# Patient Record
Sex: FEMALE | Race: BLACK OR AFRICAN AMERICAN | NOT HISPANIC OR LATINO | URBAN - METROPOLITAN AREA
[De-identification: names, ages, dates, MRNs, and addresses within clinical notes are randomized per-mention and may not be internally consistent; named-entity substitution may affect disease eponyms.]

---

## 2018-06-22 PROBLEM — Z00.00 ENCOUNTER FOR PREVENTIVE HEALTH EXAMINATION: Status: ACTIVE | Noted: 2018-06-22

## 2022-09-20 ENCOUNTER — EMERGENCY (EMERGENCY)
Facility: HOSPITAL | Age: 27
LOS: 1 days | Discharge: HOME | End: 2022-09-20
Attending: EMERGENCY MEDICINE | Admitting: EMERGENCY MEDICINE

## 2022-09-20 VITALS
SYSTOLIC BLOOD PRESSURE: 134 MMHG | OXYGEN SATURATION: 100 % | DIASTOLIC BLOOD PRESSURE: 97 MMHG | TEMPERATURE: 98 F | HEART RATE: 17 BPM

## 2022-09-20 VITALS
HEART RATE: 61 BPM | DIASTOLIC BLOOD PRESSURE: 82 MMHG | SYSTOLIC BLOOD PRESSURE: 126 MMHG | RESPIRATION RATE: 16 BRPM | OXYGEN SATURATION: 100 %

## 2022-09-20 PROCEDURE — 99283 EMERGENCY DEPT VISIT LOW MDM: CPT

## 2022-09-20 RX ORDER — ONDANSETRON 8 MG/1
4 TABLET, FILM COATED ORAL ONCE
Refills: 0 | Status: COMPLETED | OUTPATIENT
Start: 2022-09-20 | End: 2022-09-20

## 2022-09-20 RX ORDER — ONDANSETRON 8 MG/1
1 TABLET, FILM COATED ORAL
Qty: 10 | Refills: 0
Start: 2022-09-20

## 2022-09-20 RX ORDER — OXYCODONE AND ACETAMINOPHEN 5; 325 MG/1; MG/1
1 TABLET ORAL ONCE
Refills: 0 | Status: DISCONTINUED | OUTPATIENT
Start: 2022-09-20 | End: 2022-09-20

## 2022-09-20 RX ORDER — IBUPROFEN 200 MG
600 TABLET ORAL ONCE
Refills: 0 | Status: COMPLETED | OUTPATIENT
Start: 2022-09-20 | End: 2022-09-20

## 2022-09-20 RX ADMIN — Medication 600 MILLIGRAM(S): at 12:19

## 2022-09-20 RX ADMIN — ONDANSETRON 4 MILLIGRAM(S): 8 TABLET, FILM COATED ORAL at 12:19

## 2022-09-20 RX ADMIN — Medication 1 TABLET(S): at 11:12

## 2022-09-20 RX ADMIN — OXYCODONE AND ACETAMINOPHEN 1 TABLET(S): 5; 325 TABLET ORAL at 12:19

## 2022-09-20 RX ADMIN — Medication 600 MILLIGRAM(S): at 11:12

## 2022-09-20 RX ADMIN — OXYCODONE AND ACETAMINOPHEN 1 TABLET(S): 5; 325 TABLET ORAL at 11:12

## 2022-09-20 NOTE — ED PROVIDER NOTE - PATIENT PORTAL LINK FT
You can access the FollowMyHealth Patient Portal offered by [facility] by registering at the following website: [email or web address]. By joining [employer]’s FollowMyHealth portal, you will also be able to view your health information using other applications (apps) compatible with our system.

## 2022-09-20 NOTE — ED PROVIDER NOTE - OBJECTIVE STATEMENT
28 y/o F w/ no pertinent PMHx presents to ED c/o dental pain. Pt reports she developed dental pain in 05/2022 and was informed by her dentist at the time that she would  need a root canal. She states she has been looking for an oral surgeon who will perform the procedure and that accepts her insurance. Notes the pain has been worsening. She expresses that pain was previously well-controlled by OTC medications, however, pain is now too severe. Pt states she has hot and cold intolerance. Denies fever and chills. 26 y/o F w/ no pertinent PMHx presents to ED c/o dental pain. Pt reports she developed dental pain in 05/2022 and was informed by her dentist at the time that she would  need a root canal. She states she has been looking for an oral surgeon who will perform the procedure and that accepts her insurance. Notes the pain has been worsening. She expresses that pain was previously well-controlled by OTC medications, however, pain is now too severe. Pt states she has hot and cold intolerance over that tooth. Denies fever and chills.

## 2022-09-20 NOTE — ED ADULT NURSE REASSESSMENT NOTE - SYMPTOMS
pt states jaw pain is gone but she is nauseaous after medication. no vomiting, provider aware, given zofran as ordered

## 2022-09-20 NOTE — ED ADULT NURSE NOTE - CHIEF COMPLAINT
Behavioral Health discharge instructions and resources have been added to the After Visit Summary. .Primitivo spoke with pt who requested IP treatment for cocaine use but explained to him that IP treatment was not an option but [facility] . Pt was upset and refused to continue to talk ED MD and RN aware    The patient is a 27y Female complaining of

## 2022-09-20 NOTE — ED PROVIDER NOTE - TOOTH FINDINGS
right upper 5th tooth from the middle, tender to percussion. fracture to the tooth w/ exposed dentin. no gingival erythema or swelling.

## 2022-09-20 NOTE — ED PROVIDER NOTE - CLINICAL SUMMARY MEDICAL DECISION MAKING FREE TEXT BOX
26 y/o F w/ no pertinent PMHx presents to ED c/o dental pain. Likely pupitis w/ fractured tooth. Will provide pain control and assist in securing oral surgeon follow-up for root canal procedure. Will obtain UCG, Augmentin, ibuprofen, and percocet. I discussed the pt w/ discharge center and they will discuss oral surgeon followup. 28 y/o F w/ no pertinent PMHx presents to ED c/o dental pain. Likely pulpitis w/ fractured tooth. Will provide pain control and assist in securing oral surgeon follow-up for root canal procedure. Will obtain UCG, give Augmentin, ibuprofen, and percocet. I discussed the pt w/ discharge center and they will discuss oral surgeon followup.

## 2022-09-20 NOTE — ED ADULT NURSE NOTE - OBJECTIVE STATEMENT
27 y female received in intake 15, A&OX4,  ambulatory. Pt c/o right upper mouth pain that radiates to her lower mouth and headache. Pt knew she had infected root canal as of May 2022, left unteated, pain worsening over last few days that prompted ED visit. Pt states there is bleeding in her gums that is normal due to her "poor dental hygiene" Denies dizziness, blurry vision, CP, SOB, drooling, difficulty swallowing, difficulty clearing secretions. Medicated as EMR orders. Educated not to drive while taking medications. Crackers given.

## 2022-09-20 NOTE — ED ADULT TRIAGE NOTE - CHIEF COMPLAINT QUOTE
pt informed she needed a root canal in may, states she has been unable to get an appointment with an oral surgeon. c/o pain.

## 2023-04-07 ENCOUNTER — EMERGENCY (EMERGENCY)
Facility: HOSPITAL | Age: 28
LOS: 1 days | Discharge: HOME | End: 2023-04-07
Attending: INTERNAL MEDICINE | Admitting: INTERNAL MEDICINE
Payer: MEDICAID

## 2023-04-07 VITALS
DIASTOLIC BLOOD PRESSURE: 79 MMHG | TEMPERATURE: 99 F | HEART RATE: 93 BPM | OXYGEN SATURATION: 100 % | SYSTOLIC BLOOD PRESSURE: 121 MMHG | HEIGHT: 69 IN | WEIGHT: 179.9 LBS | RESPIRATION RATE: 16 BRPM

## 2023-04-07 PROCEDURE — 90471 IMMUNIZATION ADMIN: CPT

## 2023-04-07 PROCEDURE — 99283 EMERGENCY DEPT VISIT LOW MDM: CPT | Mod: 25

## 2023-04-07 PROCEDURE — 99284 EMERGENCY DEPT VISIT MOD MDM: CPT

## 2023-04-07 PROCEDURE — 90377 RABIES IG HT&SOL HUMAN IM/SC: CPT

## 2023-04-07 PROCEDURE — 90675 RABIES VACCINE IM: CPT

## 2023-04-07 RX ORDER — RABIES VACC, HUMAN DIPLOID/PF 2.5 UNIT
1 VIAL (EA) INTRAMUSCULAR ONCE
Refills: 0 | Status: DISCONTINUED | OUTPATIENT
Start: 2023-04-07 | End: 2023-04-07

## 2023-04-07 RX ORDER — RABIES VACC, HUMAN DIPLOID/PF 2.5 UNIT
1 VIAL (EA) INTRAMUSCULAR ONCE
Refills: 0 | Status: COMPLETED | OUTPATIENT
Start: 2023-04-07 | End: 2023-04-07

## 2023-04-07 RX ORDER — HUMAN RABIES VIRUS IMMUNE GLOBULIN 150 [IU]/ML
1650 INJECTION, SOLUTION INTRAMUSCULAR ONCE
Refills: 0 | Status: COMPLETED | OUTPATIENT
Start: 2023-04-07 | End: 2023-04-07

## 2023-04-07 RX ADMIN — Medication 1 TABLET(S): at 18:01

## 2023-04-07 RX ADMIN — Medication 1 MILLILITER(S): at 17:28

## 2023-04-07 RX ADMIN — HUMAN RABIES VIRUS IMMUNE GLOBULIN 1650 UNIT(S): 150 INJECTION, SOLUTION INTRAMUSCULAR at 17:27

## 2023-04-07 NOTE — ED ADULT TRIAGE NOTE - CHIEF COMPLAINT QUOTE
Pt was bit and scratched by cat that was not up to date on his rabies shot, one year overdue.  pt seen at [facility] and sent here for rabies shot. Pt was bit and scratched by cat that was not up to date on his rabies shot, one year overdue.  pt seen at [facility] and sent here for rabies shot. Pt was bit and scratched by cat that was not up to date on his rabies shot, one year overdue.  pt seen at [facility] and sent here for rabies shot.

## 2023-04-07 NOTE — ED ADULT NURSE NOTE - OBJECTIVE STATEMENT
Pt BIB self c/o cat bite to left hand. 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 Pt states the cat was and indoor cat that was not up to date with rabies vaccines.

## 2023-04-07 NOTE — ED ADULT NURSE NOTE - NSIMPLEMENTINTERV_GEN_ALL_ED
Implemented All Universal Safety Interventions:  [place] to call system. Call bell, personal items and telephone within reach. Instruct patient to call for assistance. Room bathroom lighting operational. Non-slip footwear when patient is off stretcher. Physically safe environment: no spills, clutter or unnecessary equipment. Stretcher in lowest position, wheels locked, appropriate side rails in place. Implemented All Universal Safety Interventions:  [place] to call system. Call bell, personal items and telephone within reach. Instruct patient to call for assistance. Room bathroom lighting operational. Non-slip footwear when patient is off stretcher. Physically safe environment: no spills, clutter or unnecessary equipment. Stretcher in lowest position, wheels locked, appropriate side rails in place. Implemented All Universal Safety Interventions:  [place] to call system. Call bell, personal items and telephone within reach. Instruct patient to call for assistance. Room bathroom lighting operational. Non-slip footwear when patient is off stretcher. Physically safe environment: no spills, clutter or unnecessary equipment. Stretcher in lowest position, wheels locked, appropriate side rails in place.

## 2023-04-07 NOTE — ED PROVIDER NOTE - PHYSICAL EXAMINATION
General:     NAD, well-nourished, well-appearing  Head:     NC/AT, EOMI, oral mucosa moist  Neck:     trachea midline  Lungs:     CTA b/l, no w/r/r  CVS:     S1S2, RRR, no m/g/r  Abd:     +BS, s/nt/nd, no organomegaly  Ext:    2+ radial and pedal pulses, no c/c/e  Left hand on the medial aspect medial to the thenar eminence  2 puncture marks  Neuro: AAOx3, no sensory/motor deficits

## 2023-04-07 NOTE — ED PROVIDER NOTE - CLINICAL SUMMARY MEDICAL DECISION MAKING FREE TEXT BOX
27-year-old female #referred from the [facility] MD cat bite left hand 1 PM today Not vaccinated stray cat patient was treated with tetanus 1 mL IM at significant and was sent here for rabies vac  and antibiotics  patient denies any past medical history  Plan to give the rabies vaccine rabies immunoglobulin in the hand and Augmentin   schedule the second dose April 10 the third dose April 14 third dose April 21 and the fifth dose May 5 27-year-old female #referred from the [facility] MD cat bite left hand 1 PM today Not vaccinated stray cat patient was treated with tetanus 1 mL IM at significant and was sent here for rabies vac  and antibiotics  patient denies any past medical history  Plan to give the rabies vaccine rabies immunoglobulin in the hand and Augmentin   schedule the second dose April 10 the third dose April 14 third dose April 21 and the fifth dose May 5 27-year-old female #referred from the [facility] MD cat bite left hand 1 PM today Not vaccinated stray cat patient was treated with tetanus 1 mL IM at significant and was sent here for rabies vac  and antibiotics  patient denies any past medical history  Plan to give the rabies vaccine rabies immunoglobulin in the hand and Augmentin   schedule the second dose April 10 the third dose April 14 third dose April 21 and the fifth dose May 5

## 2023-04-07 NOTE — ED PROVIDER NOTE - NSFOLLOWUPINSTRUCTIONS_ED_ALL_ED_FT
Follow up with your PMD within 1-2 days.  Rest, increase your fluids, advance your activity as tolerated.   Take all of your other medications as previously prescribed.   Worsening, continued or ANY new concerning symptoms return to the emergency department.  Continue Augmentin 875 mg twice daily rabies schedule the second dose April 10 the third dose April 14 third dose April 21 and the fifth dose May 5

## 2023-04-07 NOTE — ED PROVIDER NOTE - PATIENT PORTAL LINK FT
You can access the FollowMyHealth Patient Portal offered by [facility] by registering at the following website: [email or web address]. By joining [employer]’s FollowMyHealth portal, you will also be able to view your health information using other applications (apps) compatible with our system. You can access the FollowMyHealth Patient Portal offered by [facility] by registering at the following website: [email or web address]. By joining [employer]’s FollowMyHealth portal, you will also be able to view your health information using other applications (apps) compatible with our system. You can access the FollowMyHealth Patient Portal offered by [facility] by registering at the following website: [email or web address]. By joining [employer]’s FollowMyHealth portal, you will also be able to view your health information using other applications (apps) compatible with our system.

## 2023-04-07 NOTE — ED ADULT NURSE NOTE - WILL THE PATIENT ACCEPT THE PFIZER COVID-19 VACCINE IF ELIGIBLE AND IT IS AVAILABLE?
Patient has a appointment with PCP on April 26. Patient is out of meds and would like a refill until appointment.    hTierno     No

## 2023-04-07 NOTE — ED PROVIDER NOTE - OBJECTIVE STATEMENT
27-year-old female #referred from the [facility] MD cat bite left hand 1 PM today Not vaccinated stray cat patient was treated with tetanus 1 mL IM at significant and was sent here for rabies vac  and antibiotics  patient denies any past medical history 27-year-old female #referred from the [facility] MD cat bite left hand 1 PM today Not vaccinated stray cat patient was treated with tetanus 1 mL IM at significant and was sent here for rabies vac  and antibiotics  patient denies any past medical history 27-year-old female #referred from the [facility] MD cat bite left hand 1 PM today Not vaccinated stray cat patient was treated with tetanus 1 mL IM at significant and was sent here for rabies vac  and antibiotics  patient denies any past medical history

## 2023-04-07 NOTE — ED ADULT NURSE NOTE - CHIEF COMPLAINT QUOTE
Pt was bit and scratched by cat that was not up to date on his rabies shot, one year overdue.  pt seen at [facility] and sent here for rabies shot. Pt was bit and scratched by cat that was not up to date on his rabies shot, one year overdue.  pt seen at [facility] and sent here for rabies shot. Pt was bit and scratched by cat that was not up to date on his rabies shot, one year overdue.  pt seen at [facility] and sent here for rabies shot.

## 2023-04-10 ENCOUNTER — EMERGENCY (EMERGENCY)
Facility: HOSPITAL | Age: 28
LOS: 1 days | Discharge: HOME | End: 2023-04-10
Attending: STUDENT IN AN ORGANIZED HEALTH CARE EDUCATION/TRAINING PROGRAM | Admitting: STUDENT IN AN ORGANIZED HEALTH CARE EDUCATION/TRAINING PROGRAM
Payer: MEDICAID

## 2023-04-10 VITALS
HEIGHT: 69 IN | OXYGEN SATURATION: 99 % | SYSTOLIC BLOOD PRESSURE: 127 MMHG | WEIGHT: 179.9 LBS | RESPIRATION RATE: 18 BRPM | TEMPERATURE: 99 F | HEART RATE: 70 BPM | DIASTOLIC BLOOD PRESSURE: 84 MMHG

## 2023-04-10 PROCEDURE — 99281 EMR DPT VST MAYX REQ PHY/QHP: CPT | Mod: 25

## 2023-04-10 PROCEDURE — 99283 EMERGENCY DEPT VISIT LOW MDM: CPT

## 2023-04-10 PROCEDURE — 90675 RABIES VACCINE IM: CPT

## 2023-04-10 PROCEDURE — 90471 IMMUNIZATION ADMIN: CPT

## 2023-04-10 RX ORDER — RABIES VACC, HUMAN DIPLOID/PF 2.5 UNIT
1 VIAL (EA) INTRAMUSCULAR ONCE
Refills: 0 | Status: COMPLETED | OUTPATIENT
Start: 2023-04-10 | End: 2023-04-10

## 2023-04-10 RX ADMIN — Medication 1 MILLILITER(S): at 19:35

## 2023-04-10 NOTE — ED PROVIDER NOTE - PATIENT PORTAL LINK FT
You can access the FollowMyHealth Patient Portal offered by [facility] by registering at the following website: [email or web address]. By joining [employer]’s FollowMyHealth portal, you will also be able to view your health information using other applications (apps) compatible with our system. You can access the FollowMyHealth Patient Portal offered by [facility] by registering at the following website: [email or web address]. By joining [employer]’s FollowMyHealth portal, you will also be able to view your health information using other applications (apps) compatible with our system. You can access the FollowMyHealth Patient Portal offered by [facility] by registering at the following website: [email or web address]. By joining [employer]’s FollowMyHealth portal, you will also be able to view your health information using other applications (apps) compatible with our system.

## 2023-04-10 NOTE — ED PROVIDER NOTE - OBJECTIVE STATEMENT
27 yr old female with no PMHX presents s/p cat bite on 4/7 and seen in ED, given rx for Augmentin, first dose of rabies vaccine, presents today for second dose of rabies vaccine. Pt with no complaints. Denies any other symptoms.

## 2023-04-10 NOTE — ED ADULT NURSE NOTE - NSIMPLEMENTINTERV_GEN_ALL_ED
Implemented All Universal Safety Interventions:  [place] to call system. Call bell, personal items and telephone within reach. Instruct patient to call for assistance. Room bathroom lighting operational. Non-slip footwear when patient is off stretcher. Physically safe environment: no spills, clutter or unnecessary equipment. Stretcher in lowest position, wheels locked, appropriate side rails in place. Implemented All Universal Safety Interventions:  [place] to call system. Call bell, personal items and telephone within reach. Instruct patient to call for assistance. Room bathroom lighting operational. Non-slip footwear when patient is off stretcher. Physically safe environment: no spills, clutter or unnecessary equipment. Stretcher in lowest position, wheels locked, appropriate side rails in place. Implemented All Universal Safety Interventions:  [place] to call system. Call bell, personal items and telephone within reach. Instruct patient to call for assistance. Room bathroom lighting operational. Non-slip footwear when patient is off stretcher. Physically safe environment: no spills, clutter or unnecessary equipment. Stretcher in lowest position, wheels locked, appropriate side rails in place.

## 2023-04-10 NOTE — ED ADULT NURSE NOTE - OBJECTIVE STATEMENT
27F presents to ED reporting need for rabies vaccination, pt in NAD denies any other complaints at this time. pt updted and educated on rabies vaccination schedule and follow up.

## 2023-04-10 NOTE — ED PROVIDER NOTE - CLINICAL SUMMARY MEDICAL DECISION MAKING FREE TEXT BOX
27 yr old female with no PMHX presents s/p cat bite on 4/7 and seen in ED, given rx for Augmentin, first dose of rabies vaccine, presents today for second dose of rabies vaccine. Pt with no complaints. Denies any other symptoms.   Follow up with your PMD within 1-2 days.

## 2023-04-10 NOTE — ED PROVIDER NOTE - NSFOLLOWUPINSTRUCTIONS_ED_ALL_ED_FT
Follow up with your PMD within 1-2 days.  Worsening, continued or ANY new concerning symptoms return to the emergency department.  Continue taking Augmentin 875 mg twice daily   rabies schedule:  third dose April 14 third dose April 21 and the fifth dose May 5      Rabies Vaccine (By injection)  Rabies Vaccine (RAY-beevilma VAX-een)    Prevents infection caused by rabies virus. The vaccine can be given before or after you are exposed to the rabies virus.    Brand Name(s):Imovax Rabies,RabAvert  There may be other brand names for this medicine.    When This Medicine Should Not Be Used:  This medicine is not right for everyone. You should not receive this vaccine if you had an allergic reaction to rabies vaccine.    How to Use This Medicine:  Injectable    Your doctor will prescribe your exact dose and tell you how often it should be given. This medicine is given as a shot into one of your muscles. The vaccine is injected into the upper arm muscle. Very young or small children may have the vaccine injected into the upper leg muscle.  A nurse or other health provider will give you this medicine.  You are at risk for exposure to the rabies virus if you work with animals or will be going to a country where rabies is common. People who are at risk of being exposed to rabies will receive 3 doses on 3 different days within a 1-month period.  If you have received the vaccine in the past and have been exposed to the rabies virus, you will need to receive 2 doses on 2 different days within a 1-month period.  If you have not yet received the vaccine and were exposed to the rabies virus, you will need a total of 5 doses on 5 different days within a 1-month period. You will also receive a shot of rabies immune globulin.  It is very important that you have the shots on the exact day your doctor tells you to.  Missed dose: You must use this medicine on a fixed schedule. Call your doctor or pharmacist if you miss a dose.  Drugs and Foods to Avoid:  Ask your doctor or pharmacist before using any other medicine, including over-the-counter medicines, vitamins, and herbal products.    Tell your doctor before you receive this vaccine if you take medicine that weakens your immune system, such as a steroid (including dexamethasone, hydrocortisone, methylprednisolone, prednisolone, prednisone) or cancer treatment.  Warnings While Using This Medicine:    Tell your doctor if you are pregnant or breastfeeding. Tell your doctor if you have had an allergic reaction to any type of vaccine, if you have an illness with fever, or if you have an immune system problem.  This medicine is made from donated human blood. Some human blood products have transmitted viruses to people who have received them, although the risk is low. Human donors and donated blood are both tested for viruses to keep the transmission risk low. Talk with your doctor about this risk if you are concerned.  Your doctor will do lab tests at regular visits to check on the effects of this medicine. Keep all appointments.  Possible Side Effects While Using This Medicine:  Call your doctor right away if you notice any of these side effects:    Allergic reaction: Itching or hives, swelling in your face or hands, swelling or tingling in your mouth or throat, chest tightness, trouble breathing  Muscle pain, stiffness, or weakness  Numbness, tingling, or burning pain in your hands, arms, legs, or feet  If you notice these less serious side effects, talk with your doctor:    Dizziness, headache  Fever  Itching, pain, redness, or swelling where the shot was given  Nausea, stomach pain  Tiredness  If you notice other side effects that you think are caused by this medicine, tell your doctor.  Call your doctor for medical advice about side effects. You may report side effects to FDA at 4-373-FDA-3571    © [employer] US L.P[employer] 1973, 2023   	  back to top            © [employer] L.P[employer] 1973, 2023 Follow up with your PMD within 1-2 days.  Worsening, continued or ANY new concerning symptoms return to the emergency department.  Continue taking Augmentin 875 mg twice daily   rabies schedule:  third dose April 14 third dose April 21 and the fifth dose May 5      Rabies Vaccine (By injection)  Rabies Vaccine (RAY-beevilma VAX-een)    Prevents infection caused by rabies virus. The vaccine can be given before or after you are exposed to the rabies virus.    Brand Name(s):Imovax Rabies,RabAvert  There may be other brand names for this medicine.    When This Medicine Should Not Be Used:  This medicine is not right for everyone. You should not receive this vaccine if you had an allergic reaction to rabies vaccine.    How to Use This Medicine:  Injectable    Your doctor will prescribe your exact dose and tell you how often it should be given. This medicine is given as a shot into one of your muscles. The vaccine is injected into the upper arm muscle. Very young or small children may have the vaccine injected into the upper leg muscle.  A nurse or other health provider will give you this medicine.  You are at risk for exposure to the rabies virus if you work with animals or will be going to a country where rabies is common. People who are at risk of being exposed to rabies will receive 3 doses on 3 different days within a 1-month period.  If you have received the vaccine in the past and have been exposed to the rabies virus, you will need to receive 2 doses on 2 different days within a 1-month period.  If you have not yet received the vaccine and were exposed to the rabies virus, you will need a total of 5 doses on 5 different days within a 1-month period. You will also receive a shot of rabies immune globulin.  It is very important that you have the shots on the exact day your doctor tells you to.  Missed dose: You must use this medicine on a fixed schedule. Call your doctor or pharmacist if you miss a dose.  Drugs and Foods to Avoid:  Ask your doctor or pharmacist before using any other medicine, including over-the-counter medicines, vitamins, and herbal products.    Tell your doctor before you receive this vaccine if you take medicine that weakens your immune system, such as a steroid (including dexamethasone, hydrocortisone, methylprednisolone, prednisolone, prednisone) or cancer treatment.  Warnings While Using This Medicine:    Tell your doctor if you are pregnant or breastfeeding. Tell your doctor if you have had an allergic reaction to any type of vaccine, if you have an illness with fever, or if you have an immune system problem.  This medicine is made from donated human blood. Some human blood products have transmitted viruses to people who have received them, although the risk is low. Human donors and donated blood are both tested for viruses to keep the transmission risk low. Talk with your doctor about this risk if you are concerned.  Your doctor will do lab tests at regular visits to check on the effects of this medicine. Keep all appointments.  Possible Side Effects While Using This Medicine:  Call your doctor right away if you notice any of these side effects:    Allergic reaction: Itching or hives, swelling in your face or hands, swelling or tingling in your mouth or throat, chest tightness, trouble breathing  Muscle pain, stiffness, or weakness  Numbness, tingling, or burning pain in your hands, arms, legs, or feet  If you notice these less serious side effects, talk with your doctor:    Dizziness, headache  Fever  Itching, pain, redness, or swelling where the shot was given  Nausea, stomach pain  Tiredness  If you notice other side effects that you think are caused by this medicine, tell your doctor.  Call your doctor for medical advice about side effects. You may report side effects to FDA at 8-039-FDA-8837    © [employer] US L.P. 1973, 2023   	  back to top            © [employer] US L.P. 1973, 2023 Follow up with your PMD within 1-2 days.  Worsening, continued or ANY new concerning symptoms return to the emergency department.  Continue taking Augmentin 875 mg twice daily   rabies schedule:  third dose April 14 third dose April 21 and the fifth dose May 5      Rabies Vaccine (By injection)  Rabies Vaccine (RAY-beevilma VAX-een)    Prevents infection caused by rabies virus. The vaccine can be given before or after you are exposed to the rabies virus.    Brand Name(s):Imovax Rabies,RabAvert  There may be other brand names for this medicine.    When This Medicine Should Not Be Used:  This medicine is not right for everyone. You should not receive this vaccine if you had an allergic reaction to rabies vaccine.    How to Use This Medicine:  Injectable    Your doctor will prescribe your exact dose and tell you how often it should be given. This medicine is given as a shot into one of your muscles. The vaccine is injected into the upper arm muscle. Very young or small children may have the vaccine injected into the upper leg muscle.  A nurse or other health provider will give you this medicine.  You are at risk for exposure to the rabies virus if you work with animals or will be going to a country where rabies is common. People who are at risk of being exposed to rabies will receive 3 doses on 3 different days within a 1-month period.  If you have received the vaccine in the past and have been exposed to the rabies virus, you will need to receive 2 doses on 2 different days within a 1-month period.  If you have not yet received the vaccine and were exposed to the rabies virus, you will need a total of 5 doses on 5 different days within a 1-month period. You will also receive a shot of rabies immune globulin.  It is very important that you have the shots on the exact day your doctor tells you to.  Missed dose: You must use this medicine on a fixed schedule. Call your doctor or pharmacist if you miss a dose.  Drugs and Foods to Avoid:  Ask your doctor or pharmacist before using any other medicine, including over-the-counter medicines, vitamins, and herbal products.    Tell your doctor before you receive this vaccine if you take medicine that weakens your immune system, such as a steroid (including dexamethasone, hydrocortisone, methylprednisolone, prednisolone, prednisone) or cancer treatment.  Warnings While Using This Medicine:    Tell your doctor if you are pregnant or breastfeeding. Tell your doctor if you have had an allergic reaction to any type of vaccine, if you have an illness with fever, or if you have an immune system problem.  This medicine is made from donated human blood. Some human blood products have transmitted viruses to people who have received them, although the risk is low. Human donors and donated blood are both tested for viruses to keep the transmission risk low. Talk with your doctor about this risk if you are concerned.  Your doctor will do lab tests at regular visits to check on the effects of this medicine. Keep all appointments.  Possible Side Effects While Using This Medicine:  Call your doctor right away if you notice any of these side effects:    Allergic reaction: Itching or hives, swelling in your face or hands, swelling or tingling in your mouth or throat, chest tightness, trouble breathing  Muscle pain, stiffness, or weakness  Numbness, tingling, or burning pain in your hands, arms, legs, or feet  If you notice these less serious side effects, talk with your doctor:    Dizziness, headache  Fever  Itching, pain, redness, or swelling where the shot was given  Nausea, stomach pain  Tiredness  If you notice other side effects that you think are caused by this medicine, tell your doctor.  Call your doctor for medical advice about side effects. You may report side effects to FDA at 8-955-FDA-0673    © [employer] US L.P. 1973, 2023   	  back to top            © [employer] US L.P. 1973, 2023

## 2023-04-14 ENCOUNTER — EMERGENCY (EMERGENCY)
Facility: HOSPITAL | Age: 28
LOS: 1 days | Discharge: HOME | End: 2023-04-14
Attending: STUDENT IN AN ORGANIZED HEALTH CARE EDUCATION/TRAINING PROGRAM | Admitting: STUDENT IN AN ORGANIZED HEALTH CARE EDUCATION/TRAINING PROGRAM
Payer: MEDICAID

## 2023-04-14 VITALS
RESPIRATION RATE: 20 BRPM | SYSTOLIC BLOOD PRESSURE: 116 MMHG | HEIGHT: 69 IN | TEMPERATURE: 99 F | HEART RATE: 78 BPM | OXYGEN SATURATION: 100 % | DIASTOLIC BLOOD PRESSURE: 78 MMHG | WEIGHT: 182.98 LBS

## 2023-04-14 PROCEDURE — 90675 RABIES VACCINE IM: CPT

## 2023-04-14 PROCEDURE — 99281 EMR DPT VST MAYX REQ PHY/QHP: CPT | Mod: 25

## 2023-04-14 PROCEDURE — 90471 IMMUNIZATION ADMIN: CPT

## 2023-04-14 PROCEDURE — 99284 EMERGENCY DEPT VISIT MOD MDM: CPT

## 2023-04-14 RX ORDER — RABIES VACC, HUMAN DIPLOID/PF 2.5 UNIT
1 VIAL (EA) INTRAMUSCULAR ONCE
Refills: 0 | Status: COMPLETED | OUTPATIENT
Start: 2023-04-14 | End: 2023-04-14

## 2023-04-14 RX ADMIN — Medication 1 MILLILITER(S): at 20:43

## 2023-04-14 NOTE — ED PROVIDER NOTE - OBJECTIVE STATEMENT
27-year-old female no significant past medical history, was bit by cat 1 week ago and treated for rabies, presents for day 7 rabies vaccine.  Patient denies fevers chills, endorses mild soreness at injection site.  No other medical complaints.

## 2023-04-14 NOTE — ED ADULT NURSE NOTE - NSIMPLEMENTINTERV_GEN_ALL_ED
Implemented All Universal Safety Interventions:  [place] to call system. Call bell, personal items and telephone within reach. Instruct patient to call for assistance. Room bathroom lighting operational. Non-slip footwear when patient is off stretcher. Physically safe environment: no spills, clutter or unnecessary equipment. Stretcher in lowest position, wheels locked, appropriate side rails in place. Implemented All Universal Safety Interventions:  [place] to call system. Call bell, personal items and telephone within reach. Instruct patient to call for assistance. Room bathroom lighting operational. Non-slip footwear when patient is off stretcher. Physically safe environment: no spills, clutter or unnecessary equipment. Stretcher in lowest position, wheels locked, appropriate side rails in place. Implemented All Universal Safety Interventions:  [place] to call system. Call bell, personal items and telephone within reach. Instruct patient to call for assistance. Room bathroom lighting operational. Non-slip footwear when patient is off stretcher. Physically safe environment: no spills, clutter or unnecessary equipment. Stretcher in lowest position, wheels locked, appropriate side rails in place.

## 2023-04-14 NOTE — ED ADULT NURSE NOTE - OBJECTIVE STATEMENT
pt came from home for 3rd round of rabies shot. pt reports she was bit by an indoor/outdoor cat that was sick on the 7th. pt denies any fevers/chills/N/V.

## 2023-04-14 NOTE — ED ADULT NURSE NOTE - HAVE YOU RECEIVED AT LEAST TWO PFIZER AND/OR MODERNA VACCINATIONS (IN ANY COMBINATION) AND/OR ONE JOHNSON & JOHNSON VACCINATION?
Hypercholesterolemia Monitoring: I explained this is common when taking isotretinoin. We will monitor closely. Yes

## 2023-04-14 NOTE — ED ADULT TRIAGE NOTE - ARRIVAL INFO ADDITIONAL COMMENTS
Patient BIB self from home after animal bite, treated at [place] April 7th. Presents for third round of rabies vaccines. No fevers, no chills. No discharge or redness around bite. Patient BIB self from home after animal bite, treated at [place] April 7th. Presents for third round of rabies vaccines. No fevers, no chills. No discharge or redness around bite. Patient BIB self from home after animal bite, treated at [place] April 7th. Presents for third round of rabies vaccines. No fevers, no chills. No discharge or redness around bite.

## 2023-04-14 NOTE — ED PROVIDER NOTE - PHYSICAL EXAMINATION
Gen: Well appearing in NAD   Head: NC/AT  Neck: trachea midline  Resp:  No distress  Ext: no deformities  Neuro:  A&O appears non focal  Skin:  Warm and dry as visualized  Psych:  Normal affect and mood

## 2023-04-14 NOTE — ED PROVIDER NOTE - PATIENT PORTAL LINK FT
You can access the FollowMyHealth Patient Portal offered by [facility] by registering at the following website: [email or web address]. By joining [employer]’s FollowMyHealth portal, you will also be able to view your health information using other applications (apps) compatible with our system. You can access the FollowMyHealth Patient Portal offered by [facility] by registering at the following website: [email or web address]. By joining [employer] [facility]’s FollowMyHealth portal, you will also be able to view your health information using other applications (apps) compatible with our system. You can access the FollowMyHealth Patient Portal offered by [facility] by registering at the following website: [email or web address]. By joining [employer]’s FollowMyHealth portal, you will also be able to view your health information using other applications (apps) compatible with our system.

## 2023-04-14 NOTE — ED ADULT NURSE NOTE - PRO INTERPRETER NEED 2
Denies having traveled outside the country for the last 14 days. Denies COVID19 positive contacts within the last 14 days. Denies recent illness in the last 2 weeks.
English

## 2023-04-14 NOTE — ED PROVIDER NOTE - CLINICAL SUMMARY MEDICAL DECISION MAKING FREE TEXT BOX
27-year-old female no significant past medical history, was bit by cat 1 week ago and treated for rabies, presents for day 7 rabies vaccine.  Will administer vaccine and discharge with return precautions.

## 2023-04-21 ENCOUNTER — EMERGENCY (EMERGENCY)
Facility: HOSPITAL | Age: 28
LOS: 1 days | Discharge: HOME | End: 2023-04-21
Attending: EMERGENCY MEDICINE | Admitting: EMERGENCY MEDICINE
Payer: MEDICAID

## 2023-04-21 VITALS
HEART RATE: 99 BPM | WEIGHT: 179.9 LBS | SYSTOLIC BLOOD PRESSURE: 115 MMHG | TEMPERATURE: 98 F | DIASTOLIC BLOOD PRESSURE: 78 MMHG | HEIGHT: 69 IN | OXYGEN SATURATION: 98 % | RESPIRATION RATE: 18 BRPM

## 2023-04-21 PROCEDURE — 99283 EMERGENCY DEPT VISIT LOW MDM: CPT | Mod: 25

## 2023-04-21 PROCEDURE — 90675 RABIES VACCINE IM: CPT

## 2023-04-21 PROCEDURE — 90471 IMMUNIZATION ADMIN: CPT

## 2023-04-21 PROCEDURE — 99283 EMERGENCY DEPT VISIT LOW MDM: CPT

## 2023-04-21 RX ORDER — RABIES VACC, HUMAN DIPLOID/PF 2.5 UNIT
1 VIAL (EA) INTRAMUSCULAR ONCE
Refills: 0 | Status: DISCONTINUED | OUTPATIENT
Start: 2023-04-21 | End: 2023-04-21

## 2023-04-21 RX ORDER — RABIES VACC, HUMAN DIPLOID/PF 2.5 UNIT
1 VIAL (EA) INTRAMUSCULAR ONCE
Refills: 0 | Status: COMPLETED | OUTPATIENT
Start: 2023-04-21 | End: 2023-04-21

## 2023-04-21 RX ADMIN — Medication 1 MILLILITER(S): at 22:36

## 2023-04-21 NOTE — ED ADULT TRIAGE NOTE - IDEAL BODY WEIGHT(KG)
[facility]    [address] [place] [address]      You are scheduled for a Colonoscopy with Dr._Christopher JENNIFER Ye MD______________________ on ____03/06/20236________________   At [facility] in [place].    [facility]                 66

## 2023-04-21 NOTE — ED PROVIDER NOTE - PATIENT PORTAL LINK FT
You can access the FollowMyHealth Patient Portal offered by [facility] by registering at the following website: [email or web address]. By joining [employer]’s FollowMyHealth portal, you will also be able to view your health information using other applications (apps) compatible with our system. You can access the FollowMyHealth Patient Portal offered by [facility] by registering at the following website: [email or web address]. By joining [employer]’s FollowMyHealth portal, you will also be able to view your health information using other applications (apps) compatible with our system. You can access the FollowMyHealth Patient Portal offered by [facility] by registering at the following website: [email or web address]. By joining [employer]’s FollowMyHealth portal, you will also be able to view your health information using other applications (apps) compatible with our system.

## 2023-04-21 NOTE — ED PROVIDER NOTE - ATTENDING APP SHARED VISIT CONTRIBUTION OF CARE
This was shared visit with [facility]. I have reviewed and verified the documentation and independently performed the documented history, exam and mdm  27-year-old female no significant past medical history, was bit by cat 1 week ago and treated for rabies, presents for day 14 rabies vaccine.  Patient denies fevers chills. No other medical complaints.

## 2023-04-21 NOTE — ED ADULT NURSE NOTE - NSIMPLEMENTINTERV_GEN_ALL_ED
Implemented All Universal Safety Interventions:  [place] to call system. Call bell, personal items and telephone within reach. Instruct patient to call for assistance. Room bathroom lighting operational. Non-slip footwear when patient is off stretcher. Physically safe environment: no spills, clutter or unnecessary equipment. Stretcher in lowest position, wheels locked, appropriate side rails in place. Implemented All Universal Safety Interventions:  [place] to call system. Call bell, personal items and telephone within reach. Instruct patient to call for assistance. Room bathroom lighting operational. Non-slip footwear when patient is off stretcher. Physically safe environment: no spills, clutter or unnecessary equipment. Stretcher in lowest position, wheels locked, appropriate side rails in place. Implemented All Universal Safety Interventions:  [place] to call system. Call bell, personal items and telephone within reach. Instruct patient to call for assistance. Room bathroom lighting operational. Non-slip footwear when patient is off stretcher. Physically safe environment: no spills, clutter or unnecessary equipment. Stretcher in lowest position, wheels locked, appropriate side rails in place.

## 2023-04-21 NOTE — ED PROVIDER NOTE - OBJECTIVE STATEMENT
27-year-old female no significant past medical history, was bit by cat 1 week ago and treated for rabies, presents for day 14 rabies vaccine.  Patient denies fevers chills. No other medical complaints.

## 2023-04-21 NOTE — ED PROVIDER NOTE - CLINICAL SUMMARY MEDICAL DECISION MAKING FREE TEXT BOX
27-year-old female no significant past medical history, was bit by cat 1 week ago and treated for rabies, presents for day 14 rabies vaccine.  Patient denies fevers chills. No other medical complaints.  rabbies vaccine  Discussed with patient need to return to ED if symptoms don't continue to improve or recur or develops any new or worsening symptoms that are of concern.

## 2023-04-21 NOTE — ED ADULT NURSE NOTE - OBJECTIVE STATEMENT
Pt presents to the ER for day 14 rabies vaccine, pt was bit by cat 1 week ago A&OX4. Patient denies SOB, chest pain, dizziness, nausea or vomiting. Pt presents to the ER for 4th rabies vaccine, pt was bit by cat 4 weeks ago A&OX4. Patient denies SOB, chest pain, dizziness, nausea or vomiting.

## 2023-05-05 ENCOUNTER — EMERGENCY (EMERGENCY)
Facility: HOSPITAL | Age: 28
LOS: 1 days | Discharge: HOME | End: 2023-05-05
Attending: INTERNAL MEDICINE | Admitting: INTERNAL MEDICINE
Payer: MEDICAID

## 2023-05-05 VITALS
HEART RATE: 94 BPM | HEIGHT: 69 IN | WEIGHT: 179.9 LBS | TEMPERATURE: 97 F | SYSTOLIC BLOOD PRESSURE: 115 MMHG | RESPIRATION RATE: 16 BRPM | DIASTOLIC BLOOD PRESSURE: 76 MMHG | OXYGEN SATURATION: 99 %

## 2023-05-05 VITALS — HEART RATE: 88 BPM | RESPIRATION RATE: 18 BRPM | OXYGEN SATURATION: 99 %

## 2023-05-05 PROCEDURE — 90675 RABIES VACCINE IM: CPT

## 2023-05-05 PROCEDURE — 99283 EMERGENCY DEPT VISIT LOW MDM: CPT

## 2023-05-05 PROCEDURE — 90471 IMMUNIZATION ADMIN: CPT

## 2023-05-05 PROCEDURE — 99281 EMR DPT VST MAYX REQ PHY/QHP: CPT | Mod: 25

## 2023-05-05 RX ORDER — RABIES VACC, HUMAN DIPLOID/PF 2.5 UNIT
1 VIAL (EA) INTRAMUSCULAR ONCE
Refills: 0 | Status: COMPLETED | OUTPATIENT
Start: 2023-05-05 | End: 2023-05-05

## 2023-05-05 RX ORDER — RABIES VACC, HUMAN DIPLOID/PF 2.5 UNIT
1 VIAL (EA) INTRAMUSCULAR ONCE
Refills: 0 | Status: DISCONTINUED | OUTPATIENT
Start: 2023-05-05 | End: 2023-05-05

## 2023-05-05 RX ADMIN — Medication 1 MILLILITER(S): at 22:37

## 2023-05-05 NOTE — ED ADULT NURSE NOTE - NSIMPLEMENTINTERV_GEN_ALL_ED
Implemented All Universal Safety Interventions:  [place] to call system. Call bell, personal items and telephone within reach. Instruct patient to call for assistance. Room bathroom lighting operational. Non-slip footwear when patient is off stretcher. Physically safe environment: no spills, clutter or unnecessary equipment. Stretcher in lowest position, wheels locked, appropriate side rails in place. Implemented All Universal Safety Interventions:  [place] to call system. Call bell, personal items and telephone within reach. Instruct patient to call for assistance. Room bathroom lighting operational. Non-slip footwear when patient is off stretcher. Physically safe environment: no spills, clutter or unnecessary equipment. Stretcher in lowest position, wheels locked, appropriate side rails in place. Implemented All Universal Safety Interventions:  [place] to call system. Call bell, personal items and telephone within reach. Instruct patient to call for assistance. Room bathroom lighting operational. Non-slip footwear when patient is off stretcher. Physically safe environment: no spills, clutter or unnecessary equipment. Stretcher in lowest position, wheels locked, appropriate side rails in place.

## 2023-05-05 NOTE — ED ADULT TRIAGE NOTE - CHIEF COMPLAINT QUOTE
Patient presents to ED for rabies vaccine. Patient was here 4/7 after being bitten by cat, patient states she is here for final vaccination

## 2023-05-05 NOTE — ED PROVIDER NOTE - PATIENT PORTAL LINK FT
You can access the FollowMyHealth Patient Portal offered by [facility] by registering at the following website: [email or web address]. By joining [employer]’s FollowMyHealth portal, you will also be able to view your health information using other applications (apps) compatible with our system. You can access the FollowMyHealth Patient Portal offered by [facility] by registering at the following website: [email or web address]. By joining [facility]’s FollowMyHealth portal, you will also be able to view your health information using other applications (apps) compatible with our system. You can access the FollowMyHealth Patient Portal offered by [facility] by registering at the following website: [email or web address]. By joining [employer]’s FollowMyHealth portal, you will also be able to view your health information using other applications (apps) compatible with our system.

## 2023-05-05 NOTE — ED PROVIDER NOTE - NSICDXPASTMEDICALHX_GEN_ALL_CORE_FT
No PAST MEDICAL HISTORY:  No pertinent past medical history      PAST MEDICAL HISTORY:  No pertinent past medical history

## 2023-05-05 NOTE — ED ADULT NURSE NOTE - OBJECTIVE STATEMENT
pt presents to ED for final rabies vaccine. pt presented here on 4/7 after being bit by a cat that she believed was sick. pt reports she is here for final rabies vaccine.

## 2023-09-07 NOTE — ED ADULT TRIAGE NOTE - WEIGHT IN KG
81.6 Libtayo Pregnancy And Lactation Text: This medication is contraindicated in pregnancy and when breast feeding.

## 2023-09-25 NOTE — ED PROVIDER NOTE - SKIN, MLM
Patient calls requesting orders for her AFOs - she would like to get new padding and straps for both her left and right AFO.    Please advise.    Orders can be faxed to [facility] in [place].   Skin normal color for race, warm, dry and intact. No evidence of rash.

## 2023-12-08 PROBLEM — Z78.9 OTHER SPECIFIED HEALTH STATUS: Chronic | Status: ACTIVE | Noted: 2022-09-20

## 2024-09-22 ENCOUNTER — NON-APPOINTMENT (OUTPATIENT)
Age: 29
End: 2024-09-22

## 2025-01-09 NOTE — ED PROVIDER NOTE - NSICDXPASTMEDICALHX_GEN_ALL_CORE_FT
TSH and free T4 are within normal, although free T4 is within lower half of normal reference range and TSH is within upper half of normal range.      - IF Jammie is doing well, with no symptoms or concerns, then could stay at current levothyroxine dose, and repeat TSH, free T4 in another 6 months.     - If Jammie has been missing levothyroxine doses, then continue current levothyroxine and repeat TSH, free T4 in another 6 months    - If Jammie has any symptoms of hypothyroidism, then recommend increasing levothyroxine to 75 mcg daily and repeat TSH and free T4 in another 6-8 weeks.     - Even if Jammie does not have clear symptoms of hypothyroidism, if she has been consistent with taking levothyroxine, with her current lab results, if would like to try levothyroxine 75 mcg daily (to decrease TSH to lower half of normal reference range), that would be okay; if opt for this plan, recommend repeat TSH, free T4 in another 6-8 weeks.   PAST MEDICAL HISTORY:  No pertinent past medical history